# Patient Record
Sex: MALE | URBAN - METROPOLITAN AREA
[De-identification: names, ages, dates, MRNs, and addresses within clinical notes are randomized per-mention and may not be internally consistent; named-entity substitution may affect disease eponyms.]

---

## 2022-05-25 ENCOUNTER — OFFICE VISIT (OUTPATIENT)
Dept: URBAN - METROPOLITAN AREA CLINIC 64 | Facility: CLINIC | Age: 47
End: 2022-05-25

## 2022-05-25 DIAGNOSIS — H10.013 ACUTE BILATERAL FOLLICULAR CONJUNCTIVITIS: Primary | ICD-10-CM

## 2022-05-25 PROCEDURE — 99204 OFFICE O/P NEW MOD 45 MIN: CPT | Performed by: OPTOMETRIST

## 2022-05-25 RX ORDER — OLOPATADINE HYDROCHLORIDE 2 MG/ML
0.2 % SOLUTION OPHTHALMIC
Qty: 5 | Refills: 3 | Status: ACTIVE
Start: 2022-05-25

## 2022-05-25 RX ORDER — ERYTHROMYCIN 5 MG/G
OINTMENT OPHTHALMIC
Qty: 5 | Refills: 1 | Status: ACTIVE
Start: 2022-05-25

## 2022-05-25 ASSESSMENT — INTRAOCULAR PRESSURE
OS: 16
OD: 9

## 2022-05-25 NOTE — IMPRESSION/PLAN
Impression: Acute bilateral follicular conjunctivitis: H10.013. Plan: no signs of bacterial infection today. Patient bothered by itching eyes. RX emycin sam and patanol for allergy relief.